# Patient Record
Sex: FEMALE | Race: WHITE | NOT HISPANIC OR LATINO | Employment: UNEMPLOYED | URBAN - METROPOLITAN AREA
[De-identification: names, ages, dates, MRNs, and addresses within clinical notes are randomized per-mention and may not be internally consistent; named-entity substitution may affect disease eponyms.]

---

## 2025-06-09 ENCOUNTER — ANCILLARY PROCEDURE (OUTPATIENT)
Dept: URGENT CARE | Age: 85
End: 2025-06-09
Payer: MEDICARE

## 2025-06-09 ENCOUNTER — OFFICE VISIT (OUTPATIENT)
Dept: URGENT CARE | Age: 85
End: 2025-06-09
Payer: MEDICARE

## 2025-06-09 VITALS
BODY MASS INDEX: 30.24 KG/M2 | WEIGHT: 150 LBS | HEART RATE: 89 BPM | HEIGHT: 59 IN | RESPIRATION RATE: 16 BRPM | OXYGEN SATURATION: 95 % | TEMPERATURE: 99 F | SYSTOLIC BLOOD PRESSURE: 154 MMHG | DIASTOLIC BLOOD PRESSURE: 81 MMHG

## 2025-06-09 DIAGNOSIS — R05.3 PERSISTENT COUGH: ICD-10-CM

## 2025-06-09 DIAGNOSIS — J45.41 MODERATE PERSISTENT ASTHMA WITH EXACERBATION (HHS-HCC): Primary | ICD-10-CM

## 2025-06-09 PROCEDURE — 71046 X-RAY EXAM CHEST 2 VIEWS: CPT | Performed by: PHYSICIAN ASSISTANT

## 2025-06-09 RX ORDER — FOLIC ACID 1 MG/1
1 TABLET ORAL DAILY
COMMUNITY

## 2025-06-09 RX ORDER — HYDROXYCHLOROQUINE SULFATE 200 MG/1
1 TABLET, FILM COATED ORAL DAILY
COMMUNITY

## 2025-06-09 RX ORDER — SERTRALINE HYDROCHLORIDE 50 MG/1
TABLET, FILM COATED ORAL
COMMUNITY
Start: 2025-01-06

## 2025-06-09 RX ORDER — OMEPRAZOLE 40 MG/1
40 CAPSULE, DELAYED RELEASE ORAL
COMMUNITY
Start: 2025-02-25

## 2025-06-09 RX ORDER — DOXYCYCLINE HYCLATE 100 MG
100 TABLET ORAL 2 TIMES DAILY
Qty: 14 TABLET | Refills: 0 | Status: SHIPPED | OUTPATIENT
Start: 2025-06-09 | End: 2025-06-16

## 2025-06-09 RX ORDER — ACETAMINOPHEN 500 MG
TABLET ORAL
COMMUNITY

## 2025-06-09 RX ORDER — ALBUTEROL SULFATE 90 UG/1
AEROSOL, METERED RESPIRATORY (INHALATION)
COMMUNITY
Start: 2013-11-18

## 2025-06-09 RX ORDER — CLOPIDOGREL 75 MG/1
75 TABLET, FILM COATED ORAL EVERY OTHER DAY
COMMUNITY
Start: 2024-07-23

## 2025-06-09 RX ORDER — GLUCOSAMINE/CHONDROITIN/C/MANG 500-400 MG
CAPSULE ORAL
COMMUNITY

## 2025-06-09 RX ORDER — METHYLPREDNISOLONE 4 MG/1
TABLET ORAL
Qty: 21 TABLET | Refills: 0 | Status: SHIPPED | OUTPATIENT
Start: 2025-06-09 | End: 2025-06-16

## 2025-06-09 RX ORDER — ROSUVASTATIN CALCIUM 20 MG/1
TABLET, COATED ORAL
COMMUNITY
Start: 2025-02-07

## 2025-06-09 RX ORDER — BENZONATATE 200 MG/1
200 CAPSULE ORAL 3 TIMES DAILY PRN
Qty: 21 CAPSULE | Refills: 0 | Status: SHIPPED | OUTPATIENT
Start: 2025-06-09 | End: 2025-06-16

## 2025-06-09 RX ORDER — METOPROLOL TARTRATE 25 MG/1
TABLET, FILM COATED ORAL EVERY 24 HOURS
COMMUNITY

## 2025-06-09 RX ORDER — METOPROLOL SUCCINATE 25 MG/1
12.5 TABLET, EXTENDED RELEASE ORAL
COMMUNITY
Start: 2024-09-23

## 2025-06-09 RX ORDER — METHOTREXATE 2.5 MG/1
TABLET ORAL
COMMUNITY
Start: 2025-04-08

## 2025-06-09 RX ORDER — SULFASALAZINE 500 MG/1
500 TABLET ORAL DAILY
COMMUNITY

## 2025-06-09 ASSESSMENT — PATIENT HEALTH QUESTIONNAIRE - PHQ9
1. LITTLE INTEREST OR PLEASURE IN DOING THINGS: NOT AT ALL
SUM OF ALL RESPONSES TO PHQ9 QUESTIONS 1 AND 2: 0
2. FEELING DOWN, DEPRESSED OR HOPELESS: NOT AT ALL

## 2025-06-09 ASSESSMENT — ENCOUNTER SYMPTOMS: COUGH: 1

## 2025-06-09 NOTE — PROGRESS NOTES
"Subjective   Patient ID: Elizabeth Dacosta is a 85 y.o. female. They present today with a chief complaint of Cough (With scratchy voice and wheezing since June 1st).    History of Present Illness    Cough      85-year-old patient presents to clinic with complaints of scratchy throat with associated productive cough, wheezing, chest congestion, shortness of breath, left ear pain, sinus pressure ongoing  for about 2 weeks but worsening as of June 1 with new onset of low-grade fevers.   Reports has history of asthma. Reports has tried Mucinex DM, albuterol, other over-the-counter products without relief. Denies chest pain, dizziness, body aches, nausea, vomiting, abdominal pain, diarrhea.       Past Medical History  Allergies as of 06/09/2025 - Reviewed 06/09/2025   Allergen Reaction Noted    Ciprofloxacin GI Upset 06/09/2025       Prescriptions Prior to Admission[1]     Medical History[2]    Surgical History[3]     reports that she has quit smoking. Her smoking use included cigarettes. She has never used smokeless tobacco.    Review of Systems  ROS negative with the exception as noted on HPI                                 Objective    Vitals:    06/09/25 1646   BP: 154/81   BP Location: Left arm   Patient Position: Sitting   Pulse: 89   Resp: 16   Temp: 37.2 °C (99 °F)   SpO2: 95%   Weight: 68 kg (150 lb)   Height: 1.499 m (4' 11\")     No LMP recorded.    Physical Exam  Constitutional:       Appearance: Normal appearance.   HENT:      Head: Normocephalic and atraumatic.      Right Ear: Tympanic membrane, ear canal and external ear normal.      Left Ear: Tympanic membrane, ear canal and external ear normal.      Nose: Mucosal edema (and erythema) present. No rhinorrhea.      Right Sinus: No maxillary sinus tenderness or frontal sinus tenderness.      Left Sinus: No maxillary sinus tenderness or frontal sinus tenderness.      Mouth/Throat:      Lips: Pink.      Mouth: Mucous membranes are moist. No oral lesions.      " Dentition: Normal dentition. No gingival swelling.      Tongue: No lesions. Tongue does not deviate from midline.      Palate: No mass and lesions.      Pharynx: Posterior oropharyngeal erythema and postnasal drip present. No pharyngeal swelling or uvula swelling.   Cardiovascular:      Rate and Rhythm: Normal rate and regular rhythm.      Heart sounds: No murmur heard.  Pulmonary:      Effort: Pulmonary effort is normal. No respiratory distress.      Breath sounds: No stridor. Wheezing present. No rhonchi or rales.      Comments: Restricted on exhale at B/L lower lobes  Lymphadenopathy:      Cervical: Cervical adenopathy present.   Neurological:      Mental Status: She is alert.         Procedures    Point of Care Test & Imaging Results from this visit  No results found for this visit on 06/09/25.   Imaging  No results found.    Cardiology, Vascular, and Other Imaging  No other imaging results found for the past 2 days      Diagnostic study results (if any) were reviewed by Effie Barrientos PA-C.    Assessment/Plan   Allergies, medications, history, and pertinent labs/EKGs/Imaging reviewed by Effie Barrientos PA-C.   scratchy throat with associated productive cough, wheezing, chest congestion, shortness of breath, left ear pain, sinus pressure ongoing  for about 2 weeks but worsening as of June 1 with new onset of low-grade fevers.  Medrol Dosepak started.  Patient is advised to avoid NSAIDs while taking oral steroids.  Tessalon Perles started.  Oral antibiotics. Advised to drink plenty of fluids, run a cool-mist humidifier in room at night, and get plenty of rest. Pt. is advised to take 10 deep breaths and hours and continue to walk around every couple of hours. Patient should avoid over-exertion and reduce exposure to irritants such as smoke, cold, dry air, and dust. Patient may take acetaminophen as directed to reduce fever and body aches. Risk, benefits, and potential side effects of medication(s)  discussed with pt. Discussed disease/illness presentation, treatment options, progression, complications, and outcomes with patient. Pt. Has expressed understanding and is an agreement of plan of care.     Medical Decision Making      Orders and Diagnoses  Diagnoses and all orders for this visit:  Moderate persistent asthma with exacerbation ([facility])  -     methylPREDNISolone (Medrol Dospak) 4 mg tablets; Follow schedule on package instructions  -     benzonatate (Tessalon) 200 mg capsule; Take 1 capsule (200 mg) by mouth 3 times a day as needed for cough for up to 7 days. Do not crush or chew.  -     doxycycline (Vibra-Tabs) 100 mg tablet; Take 1 tablet (100 mg) by mouth 2 times a day for 7 days. Take with a full glass of water and do not lie down for at least 30 minutes after.  Persistent cough  -     XR chest 2 views; Future      Medical Admin Record      Patient disposition: Home    Electronically signed by Effie Barrientos PA-C  8:28 AM           [1] (Not in a hospital admission)   [2]   Past Medical History:  Diagnosis Date    Cough, unspecified     Cough    Other specified soft tissue disorders     Swelling of lower limb    Pain in unspecified joint     Joint pain    Personal history of other diseases of the musculoskeletal system and connective tissue     History of low back pain    Personal history of other diseases of the nervous system and sense organs     History of tinnitus    Personal history of other specified conditions     History of pain when walking    Spinal stenosis, lumbar region without neurogenic claudication     Lumbar stenosis    Spondylosis without myelopathy or radiculopathy, lumbar region     Lumbar spondylosis   [3]   Past Surgical History:  Procedure Laterality Date    FOOT SURGERY  08/06/2014    Foot Surgery    HYSTERECTOMY  08/06/2014    Hysterectomy    INCISIONAL BREAST BIOPSY  08/06/2014    Incisional Breast Biopsy    OTHER SURGICAL HISTORY  08/06/2014    Arthroscopy Shoulder